# Patient Record
Sex: MALE | Race: WHITE | NOT HISPANIC OR LATINO
[De-identification: names, ages, dates, MRNs, and addresses within clinical notes are randomized per-mention and may not be internally consistent; named-entity substitution may affect disease eponyms.]

---
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
## 2023-05-22 NOTE — ED PROVIDER NOTE - MUSCULOSKELETAL
Patient has an upcoming appt for right elbow pain . At this time patient has not had any imaging done, please place RX accordingly. I have notified the patient to come in 20 min prior to appointment time to have the imaging done . Please forward to the  pool to schedule imaging. Thank you.       Future Appointments   Date Time Provider Donato Loulou   6/8/2023  9:40 AM Dara Flaherty MD [facility] [id number] Spine appears normal, movement of extremities grossly intact.

## 2023-11-04 NOTE — ED PEDIATRIC NURSE NOTE - BREATH SOUNDS, MLM
AVS discussed with pt and spouse. Education provided on medications, worsening symptoms to watch out for, and close follow-up with surgeon. All questions were answered. Vitally stable upon discharge.    Clear

## 2024-06-19 NOTE — DISCHARGE NOTE PEDIATRIC - CARE PLAN
20 22 19 Principal Discharge DX:	Testicular torsion  Goal:	resolution of condition and symptoms  Assessment and plan of treatment:	Please follow up with Dr. Morton within 1-2 weeks after discharge from the hospital. You may call  to schedule an appointment.

## 2024-08-27 NOTE — PATIENT PROFILE PEDIATRIC. - NS PRO CL COPING
OFFICE VISIT      Patient: Carroll Garrido   : 1984 MRN: [id number]    SUBJECTIVE:  A 40 year old male is here for   Chief Complaint   Patient presents with   • Cyst     Cyst to back of right head for past two months. Denies any pain. Golfball size. Denies any drainage to area.    .      HISTORY OF PRESENT ILLNESS:  The patient is a 39-year-old male who presents today for an acute care visit, concerned about a cyst on his scalp.    He noticed the cyst approximately 2 months ago. It has not increased in size or drained any fluid. The cyst is slightly hard to touch but does not cause pain when he lies down or rests his head on a pillow.    He has a history of seborrheic dermatitis and scalp psoriasis and has been under the care of a dermatologist. He has been prescribed ketoconazole 2 percent shampoo and other topical treatments.    Additionally, he reports that he injured his finger, which has started to lock up frequently. He does not recall any specific incident that could have caused a fracture.      MEDICATIONS:  Current Outpatient Medications   Medication Sig   • tretinoin (RETIN-A) 0.1 % cream Apply topically nightly.   • ketoconazole (NIZORAL) 2 % shampoo Apply topically to scalp in shower. Lather. Let sit 1-2 minutes. Rinse thoroughly.   • omeprazole (PrilOSEC) 20 MG capsule Take 1 capsule by mouth daily. (Patient not taking: Reported on 2024)   • hydroCORTisone (CORTIZONE) 2.5 % ointment Massage small amount into scalp nightly prn (Patient not taking: Reported on 2024)   • acyclovir (ZOVIRAX) 5 % ointment Apply topically 5 times daily. Prn oral sores   • adapalene (DIFFERIN) 0.1 % gel APPLY TO THE AFFECTED AREA(S) OF BACK (Patient not taking: Reported on 2024)     No current facility-administered medications for this visit.     ALLERGIES:  ALLERGIES:  No Known Allergies      REVIEW OF SYSTEMS:  Negative other than that detailed in the HPI.      OBJECTIVE:  Vitals:    24 0802    BP: 118/84   BP Location: LUE - Left upper extremity   Patient Position: Sitting   Cuff Size: Large Adult   Pulse: 76   Resp: 14   SpO2: 98%   Weight: 86.2 kg (190 lb)   Height: 5' 9\" (1.753 m)       Body mass index is 28.06 kg/m².    BMI FOLLOW-UP/PLAN:  BMI is in overweight range.    Caloric restriction       PHYSICAL EXAM:   Vitals:    08/27/24 0802   BP: 118/84   Pulse: 76   Resp: 14      Constitutional: All vitals are stable. Pleasant, alert, oriented to person, place, time and date. Appears stated age. No apparent distress, Well developed, well nourished, well groomed.   HEENT:   Head: Normocephalic, atraumatic.  4 x 5 cm soft rubbery nodule right occipital scalp, no hard mass appearance, no fluctuance, no redness or induration and nontender  Musculoskeletal:   Right third finger with some limited flexion, no redness warmth or swelling, patient does have full extension to digit  Extremities:No clubbing, cyanosis or edema, Normal muscle tone.   Neurologic: CN/Cranial nerves ll through Xll grossly intact. Normal motor, strength, and sensory/sensorium and gait. Normal reflexes.  Alert, oriented.   Psychiatric: Pleasant, appropriate and oriented. Congruent mood. Normal affect.  Skin: Warm, smooth, dey, No erythema, bruising, or rash.          Review Flowsheet  More data exists       2/27/2024   PHQ 2/9 Score   Adult PHQ 2 Score 0   Adult PHQ 2 Interpretation No further screening needed   Little interest or pleasure in activity? Not at all   Feeling down, depressed or hopeless? Not at all    Details                       ASSESSMENT AND PLAN:  This is a 40 year old male who presents with :  1. Cyst of skin and subcutaneous tissue of right occipital scalp    - SERVICE TO SURGERY GENERAL for cystectomy  - Signs of infection and therefore no antibiotics given    2.  Right hand third digit tendinitis prior trauma and recommend home exercise with soft rubbery ball squeezing exercises nightly for 10-15 minutes.  If no  improvement in flexion of finger will refer to hand Ortho        Coping Well
